# Patient Record
Sex: FEMALE
[De-identification: names, ages, dates, MRNs, and addresses within clinical notes are randomized per-mention and may not be internally consistent; named-entity substitution may affect disease eponyms.]

---

## 2021-09-04 ENCOUNTER — NURSE TRIAGE (OUTPATIENT)
Dept: OTHER | Facility: CLINIC | Age: 76
End: 2021-09-04

## 2021-09-04 NOTE — TELEPHONE ENCOUNTER
PREGNANCY: \"Is there any chance you are pregnant? \" \"When was your last menstrual period? \"       No hysterectomy.     Protocols used: [facility]

## 2023-10-03 ENCOUNTER — TELEPHONE (OUTPATIENT)
Dept: PAIN MEDICINE | Facility: CLINIC | Age: 78
End: 2023-10-03
Payer: MEDICARE

## 2023-10-10 ENCOUNTER — TELEPHONE (OUTPATIENT)
Dept: PAIN MEDICINE | Facility: CLINIC | Age: 78
End: 2023-10-10
Payer: MEDICARE

## 2023-10-13 DIAGNOSIS — M54.16 LUMBAR NEURITIS: Primary | ICD-10-CM

## 2023-11-30 ENCOUNTER — APPOINTMENT (OUTPATIENT)
Dept: PAIN MEDICINE | Facility: CLINIC | Age: 78
End: 2023-11-30
Payer: MEDICARE

## 2023-11-30 ENCOUNTER — ANCILLARY PROCEDURE (OUTPATIENT)
Dept: RADIOLOGY | Facility: CLINIC | Age: 78
End: 2023-11-30
Payer: MEDICARE

## 2023-11-30 ENCOUNTER — HOSPITAL ENCOUNTER (OUTPATIENT)
Dept: PAIN MEDICINE | Facility: CLINIC | Age: 78
Discharge: HOME | End: 2023-11-30
Payer: MEDICARE

## 2023-11-30 VITALS
TEMPERATURE: 97.7 F | DIASTOLIC BLOOD PRESSURE: 59 MMHG | WEIGHT: 146 LBS | RESPIRATION RATE: 15 BRPM | HEIGHT: 60 IN | SYSTOLIC BLOOD PRESSURE: 116 MMHG | OXYGEN SATURATION: 95 % | BODY MASS INDEX: 28.66 KG/M2 | HEART RATE: 68 BPM

## 2023-11-30 DIAGNOSIS — M54.16 LUMBAR NEURITIS: ICD-10-CM

## 2023-11-30 PROCEDURE — 7100000009 HC PHASE TWO TIME - INITIAL BASE CHARGE

## 2023-11-30 PROCEDURE — A4216 STERILE WATER/SALINE, 10 ML: HCPCS

## 2023-11-30 PROCEDURE — 2500000005 HC RX 250 GENERAL PHARMACY W/O HCPCS

## 2023-11-30 PROCEDURE — 77003 FLUOROGUIDE FOR SPINE INJECT: CPT

## 2023-11-30 PROCEDURE — 2500000004 HC RX 250 GENERAL PHARMACY W/ HCPCS (ALT 636 FOR OP/ED)

## 2023-11-30 PROCEDURE — 7100000010 HC PHASE TWO TIME - EACH INCREMENTAL 1 MINUTE

## 2023-11-30 PROCEDURE — 62323 NJX INTERLAMINAR LMBR/SAC: CPT | Performed by: ANESTHESIOLOGY

## 2023-11-30 RX ORDER — TRIAMCINOLONE ACETONIDE 40 MG/ML
INJECTION, SUSPENSION INTRA-ARTICULAR; INTRAMUSCULAR
Status: COMPLETED
Start: 2023-11-30 | End: 2023-11-30

## 2023-11-30 RX ORDER — LEVOTHYROXINE SODIUM 75 UG/1
TABLET ORAL
COMMUNITY
Start: 2013-03-20

## 2023-11-30 RX ORDER — LOSARTAN POTASSIUM 25 MG/1
TABLET ORAL
COMMUNITY
Start: 2022-04-08

## 2023-11-30 RX ORDER — ALLOPURINOL 100 MG/1
TABLET ORAL
COMMUNITY
Start: 2020-09-23

## 2023-11-30 RX ORDER — SODIUM CHLORIDE 9 MG/ML
INJECTION, SOLUTION INTRAMUSCULAR; INTRAVENOUS; SUBCUTANEOUS
Status: COMPLETED
Start: 2023-11-30 | End: 2023-11-30

## 2023-11-30 RX ORDER — SERTRALINE HYDROCHLORIDE 100 MG/1
TABLET, FILM COATED ORAL
COMMUNITY
Start: 2018-01-08

## 2023-11-30 RX ORDER — METFORMIN HYDROCHLORIDE 500 MG/1
500 TABLET, EXTENDED RELEASE ORAL
COMMUNITY

## 2023-11-30 RX ORDER — ROSUVASTATIN CALCIUM 40 MG/1
TABLET, COATED ORAL
COMMUNITY
Start: 2012-11-26

## 2023-11-30 RX ORDER — LIDOCAINE HYDROCHLORIDE 5 MG/ML
INJECTION, SOLUTION INFILTRATION; INTRAVENOUS
Status: COMPLETED
Start: 2023-11-30 | End: 2023-11-30

## 2023-11-30 RX ORDER — OMEPRAZOLE 40 MG/1
CAPSULE, DELAYED RELEASE ORAL
COMMUNITY
Start: 2021-04-27

## 2023-11-30 RX ADMIN — LIDOCAINE HYDROCHLORIDE 250 MG: 5 INJECTION, SOLUTION INFILTRATION at 13:09

## 2023-11-30 RX ADMIN — TRIAMCINOLONE ACETONIDE 40 MG: 40 INJECTION, SUSPENSION INTRA-ARTICULAR; INTRAMUSCULAR at 13:10

## 2023-11-30 RX ADMIN — SODIUM CHLORIDE 10 ML: 9 INJECTION, SOLUTION INTRAMUSCULAR; INTRAVENOUS; SUBCUTANEOUS at 13:10

## 2023-11-30 ASSESSMENT — ENCOUNTER SYMPTOMS
DIAPHORESIS: 0
CONSTIPATION: 0
SPEECH DIFFICULTY: 0
CHEST TIGHTNESS: 0
SHORTNESS OF BREATH: 0
COUGH: 0
EYE DISCHARGE: 0
DIZZINESS: 0
FEVER: 0
CHILLS: 0
BACK PAIN: 1
SEIZURES: 0
DIFFICULTY URINATING: 0
NECK STIFFNESS: 0
BLOOD IN STOOL: 0
WHEEZING: 0
ARTHRALGIAS: 1
WEAKNESS: 0
NUMBNESS: 1
DIARRHEA: 0
NAUSEA: 0
VOMITING: 0
NECK PAIN: 0

## 2023-11-30 ASSESSMENT — COLUMBIA-SUICIDE SEVERITY RATING SCALE - C-SSRS
1. IN THE PAST MONTH, HAVE YOU WISHED YOU WERE DEAD OR WISHED YOU COULD GO TO SLEEP AND NOT WAKE UP?: NO
6. HAVE YOU EVER DONE ANYTHING, STARTED TO DO ANYTHING, OR PREPARED TO DO ANYTHING TO END YOUR LIFE?: NO
2. HAVE YOU ACTUALLY HAD ANY THOUGHTS OF KILLING YOURSELF?: NO

## 2023-11-30 ASSESSMENT — PAIN SCALES - GENERAL
PAINLEVEL_OUTOF10: 7
PAINLEVEL_OUTOF10: 0 - NO PAIN

## 2023-11-30 ASSESSMENT — PAIN DESCRIPTION - DESCRIPTORS: DESCRIPTORS: ACHING

## 2023-11-30 ASSESSMENT — PAIN - FUNCTIONAL ASSESSMENT
PAIN_FUNCTIONAL_ASSESSMENT: 0-10
PAIN_FUNCTIONAL_ASSESSMENT: 0-10

## 2023-11-30 NOTE — OP NOTE
11/30/2023    Pre procedure Diagnosis: Lumbar neuritis  Post procedure Diagnosis: Lumbar neuritis    Procedure:     1. L5/S1 interlaminar epidural steroid injection   2. Fluoroscopic guidance     Complications: None    Assistants: None     EBL: None    PROCEDURE: The patient was identified in the preoperative area. After risks and benefits were explained, informed consent was obtained. The patient was brought back to the operating room and placed in the prone position on the operating table. Standard ASA monitors were applied and monitored throughout the procedure. Their vital signs remained stable throughout the procedure. The patient's lumbosacral spine was prepped and draped in usual sterile fashion. Using fluoroscopic guidance the skin overlying the trajectory to the L5/S1 space was anesthetized with a total of 5 ml of 0.5% Lidocaine. Thereafter, a 3.5 in long 18G Touhy needle was advanced through the anesthitized skin. Then, the needle was advanced into the L5/S1 ligamentum flavum under multiplanar fluoroscopic guidance.  Then using a loss of resistance syringe and technique the epidural space was accessed.  After negative aspiration for heme, or CSF a total of 4mL of Preservative Free Normal Saline and 40 mg of KENALOG was injected. The needle was removed and a sterile dressing was applied. The patient tolerated the procedure well and was transported to PACU in stable condition.    PLAN: The pt will follow up in the office in one to two months to report their results with the procedure. Discharge instructions were reviewed in recovery and provided to the patient in writing. They were advised to call should they have any questions or concerns.

## 2023-11-30 NOTE — H&P
History Of Present Illness  Tea Wood is a 78 y.o. female presenting with lumbar neuritis.     Past Medical History  Past Medical History:   Diagnosis Date    Anesthesia of skin     Numbness    Dizziness and giddiness 01/05/2016    Dizziness    Dysphagia, unspecified     Difficulty swallowing    Other specified cough     Productive cough    Other specified soft tissue disorders     Leg swelling    Personal history of other diseases of the circulatory system     History of hypertension    Personal history of other diseases of the musculoskeletal system and connective tissue 01/06/2016    History of rheumatoid arthritis    Personal history of other diseases of the nervous system and sense organs     History of tinnitus    Personal history of other diseases of the nervous system and sense organs     History of hearing loss    Personal history of other diseases of the respiratory system     Personal history of asthma    Personal history of other endocrine, nutritional and metabolic disease     History of diabetes mellitus    Personal history of other specified conditions     History of shortness of breath    Snoring     Snoring    Unspecified epiphora, bilateral     Watery eyes       Surgical History  Past Surgical History:   Procedure Laterality Date    HYSTERECTOMY  01/05/2016    Hysterectomy    KNEE ARTHROSCOPY W/ DEBRIDEMENT  01/05/2016    Arthroscopy Knee    OTHER SURGICAL HISTORY  01/15/2014    Surgery    ROTATOR CUFF REPAIR  01/05/2016    Rotator Cuff Repair    SHOULDER SURGERY  01/05/2016    Shoulder Surgery    TOTAL KNEE ARTHROPLASTY  01/05/2016    Knee Replacement        Social History  She reports that she has never smoked. She has never used smokeless tobacco. She reports that she does not drink alcohol and does not use drugs.    Family History  No family history on file.     Allergies  Codeine and Sulfa (sulfonamide antibiotics)    Review of Systems   Constitutional:  Negative for chills, diaphoresis  and fever.   HENT:  Negative for ear discharge and tinnitus.    Eyes:  Negative for discharge.   Respiratory:  Negative for cough, chest tightness, shortness of breath and wheezing.    Cardiovascular:  Negative for chest pain.   Gastrointestinal:  Negative for blood in stool, constipation, diarrhea, nausea and vomiting.   Endocrine: Negative for polyuria.   Genitourinary:  Negative for difficulty urinating.   Musculoskeletal:  Positive for arthralgias, back pain and gait problem. Negative for neck pain and neck stiffness.   Skin:  Negative for rash.   Neurological:  Positive for numbness. Negative for dizziness, seizures, speech difficulty and weakness.        Physical Exam  Constitutional:       Appearance: Normal appearance.   HENT:      Head: Normocephalic.      Nose: Nose normal.      Mouth/Throat:      Mouth: Mucous membranes are moist.      Pharynx: Oropharynx is clear.   Eyes:      Extraocular Movements: Extraocular movements intact.      Conjunctiva/sclera: Conjunctivae normal.      Pupils: Pupils are equal, round, and reactive to light.   Cardiovascular:      Rate and Rhythm: Normal rate.   Pulmonary:      Effort: Pulmonary effort is normal. No respiratory distress.      Breath sounds: No wheezing.   Chest:      Chest wall: No tenderness.   Abdominal:      Palpations: Abdomen is soft.   Musculoskeletal:      Cervical back: No rigidity.   Skin:     General: Skin is warm and dry.      Findings: No rash.   Neurological:      Mental Status: She is alert and oriented to person, place, and time.      Sensory: Sensory deficit present.      Motor: Weakness present.      Gait: Gait abnormal.   Psychiatric:         Mood and Affect: Mood normal.         Behavior: Behavior normal.          Last Recorded Vitals  Blood pressure 116/59, pulse 68, temperature 36.5 °C (97.7 °F), resp. rate 15, height 1.524 m (5'), weight 66.2 kg (146 lb), SpO2 95 %.       Assessment/Plan   1. Lumbar neuritis  Epidural Steroid Injection     Epidural Steroid Injection         Lumbar epidural injection.     Gino Davidson MD

## 2023-12-27 PROBLEM — L23.7 ALLERGIC CONTACT DERMATITIS DUE TO PLANTS, EXCEPT FOOD: Status: ACTIVE | Noted: 2020-07-12

## 2023-12-27 PROBLEM — M15.4 EROSIVE OSTEOARTHRITIS: Status: ACTIVE | Noted: 2023-12-27

## 2023-12-27 PROBLEM — Z86.79 H/O: HYPERTENSION: Status: ACTIVE | Noted: 2023-12-27

## 2023-12-27 PROBLEM — Z86.69 HISTORY OF HEARING PROBLEM: Status: ACTIVE | Noted: 2023-12-27

## 2023-12-27 PROBLEM — Z86.39 H/O THYROID NODULE: Status: ACTIVE | Noted: 2023-12-27

## 2023-12-27 PROBLEM — H93.13 SUBJECTIVE TINNITUS OF BOTH EARS: Status: ACTIVE | Noted: 2023-12-27

## 2023-12-27 PROBLEM — E79.0 HYPERURICEMIA WITHOUT SIGNS OF INFLAMMATORY ARTHRITIS AND TOPHACEOUS DISEASE: Status: ACTIVE | Noted: 2022-06-03

## 2023-12-27 PROBLEM — T14.8XXA HEMATOMA: Status: ACTIVE | Noted: 2023-12-27

## 2023-12-27 PROBLEM — R06.83 SNORING: Status: ACTIVE | Noted: 2023-12-27
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
## 2023-12-27 PROBLEM — H04.209 EPIPHORA: Status: ACTIVE | Noted: 2023-12-27

## 2023-12-27 PROBLEM — M79.89 SWELLING OF LOWER LIMB: Status: ACTIVE | Noted: 2023-12-27

## 2023-12-27 PROBLEM — R07.9 CHEST PAIN: Status: ACTIVE | Noted: 2017-12-12

## 2023-12-27 PROBLEM — R20.0 NUMBNESS: Status: ACTIVE | Noted: 2023-12-27

## 2023-12-27 PROBLEM — E66.3 OVERWEIGHT WITH BODY MASS INDEX (BMI) 25.0-29.9: Status: ACTIVE | Noted: 2020-07-12

## 2023-12-27 PROBLEM — K63.5 COLON POLYPS: Status: ACTIVE | Noted: 2023-12-27

## 2023-12-27 PROBLEM — R09.89 CAROTID BRUIT: Status: ACTIVE | Noted: 2023-12-27

## 2023-12-27 PROBLEM — I35.8 AORTIC HEART MURMUR: Status: ACTIVE | Noted: 2023-12-27

## 2023-12-27 PROBLEM — R41.3 MEMORY LOSS: Status: ACTIVE | Noted: 2023-12-27

## 2023-12-27 PROBLEM — M12.9 ARTHROPATHY: Status: ACTIVE | Noted: 2023-12-27

## 2023-12-27 PROBLEM — N18.30 STAGE 3 CHRONIC KIDNEY DISEASE (MULTI): Status: ACTIVE | Noted: 2023-12-27

## 2023-12-27 PROBLEM — R76.8 ANA POSITIVE: Status: ACTIVE | Noted: 2023-12-27

## 2023-12-27 PROBLEM — D75.A DEFICIENCY OF GLUCOSE-6-PHOSPHATE DEHYDROGENASE: Status: ACTIVE | Noted: 2023-12-27

## 2023-12-27 PROBLEM — K64.9 HEMORRHOIDS: Status: ACTIVE | Noted: 2023-12-27

## 2023-12-27 PROBLEM — H04.203 BILATERAL EPIPHORA: Status: ACTIVE | Noted: 2023-12-27

## 2023-12-27 PROBLEM — H93.19 SUBJECTIVE TINNITUS: Status: ACTIVE | Noted: 2023-11-21

## 2023-12-27 PROBLEM — R42 DIZZINESS: Status: ACTIVE | Noted: 2023-12-27

## 2023-12-27 PROBLEM — M54.9 BACK PAIN, CHRONIC: Status: ACTIVE | Noted: 2023-12-27

## 2023-12-27 PROBLEM — H61.23 IMPACTED CERUMEN OF BOTH EARS: Status: ACTIVE | Noted: 2023-12-27

## 2023-12-27 PROBLEM — S09.90XA HEAD TRAUMA: Status: ACTIVE | Noted: 2023-12-27

## 2023-12-27 PROBLEM — H90.3 SENSORINEURAL HEARING LOSS OF BOTH EARS: Status: ACTIVE | Noted: 2023-12-27

## 2023-12-27 PROBLEM — E66.9 OBESITY, CLASS I, BMI 30-34.9: Status: ACTIVE | Noted: 2021-02-09

## 2023-12-27 PROBLEM — E11.649: Status: ACTIVE | Noted: 2023-12-27

## 2023-12-27 PROBLEM — R79.0 ABNORMAL LEVEL OF BLOOD MINERAL: Status: ACTIVE | Noted: 2021-09-29

## 2023-12-27 PROBLEM — E66.811 OBESITY, CLASS I, BMI 30-34.9: Status: ACTIVE | Noted: 2021-02-09

## 2023-12-27 PROBLEM — F41.9 ANXIETY DISORDER: Status: ACTIVE | Noted: 2017-12-12

## 2023-12-27 PROBLEM — H61.20 IMPACTED CERUMEN: Status: ACTIVE | Noted: 2023-12-27

## 2023-12-27 PROBLEM — G89.29 BACK PAIN, CHRONIC: Status: ACTIVE | Noted: 2023-12-27

## 2023-12-27 PROBLEM — Z86.69 HISTORY OF HEARING LOSS: Status: ACTIVE | Noted: 2023-12-27

## 2023-12-27 PROBLEM — R15.9 FECAL INCONTINENCE: Status: ACTIVE | Noted: 2023-12-27

## 2023-12-27 PROBLEM — S20.219A RIB CONTUSION: Status: ACTIVE | Noted: 2023-12-27

## 2023-12-27 PROBLEM — M45.9 ANKYLOSING POLYARTHRITIS (MULTI): Status: ACTIVE | Noted: 2023-12-27

## 2023-12-27 PROBLEM — F32.A DEPRESSION: Status: ACTIVE | Noted: 2017-12-12

## 2023-12-27 PROBLEM — M10.9 GOUT: Status: ACTIVE | Noted: 2021-09-15

## 2023-12-27 PROBLEM — J02.0 STREP THROAT: Status: ACTIVE | Noted: 2023-12-27

## 2023-12-27 PROBLEM — R05.8 PRODUCTIVE COUGH: Status: ACTIVE | Noted: 2023-12-27

## 2023-12-27 PROBLEM — E11.40 NEUROPATHY, DIABETIC (MULTI): Status: ACTIVE | Noted: 2023-12-27

## 2023-12-27 PROBLEM — E83.42 HYPOMAGNESEMIA: Status: ACTIVE | Noted: 2021-09-29

## 2023-12-27 PROBLEM — R10.11 RIGHT UPPER QUADRANT ABDOMINAL PAIN: Status: ACTIVE | Noted: 2021-02-09

## 2023-12-27 RX ORDER — METFORMIN HYDROCHLORIDE 500 MG/1
500 TABLET, EXTENDED RELEASE ORAL
COMMUNITY
Start: 2023-05-23

## 2023-12-27 RX ORDER — ESOMEPRAZOLE MAGNESIUM 40 MG/1
40 CAPSULE, DELAYED RELEASE ORAL
COMMUNITY
Start: 2021-02-09 | End: 2024-02-07 | Stop reason: ALTCHOICE

## 2023-12-27 RX ORDER — ERGOCALCIFEROL 1.25 MG/1
CAPSULE ORAL
COMMUNITY

## 2023-12-27 RX ORDER — LANOLIN ALCOHOL/MO/W.PET/CERES
1000 CREAM (GRAM) TOPICAL
COMMUNITY
Start: 2021-02-09

## 2023-12-27 RX ORDER — COLCHICINE 0.6 MG/1
CAPSULE ORAL
COMMUNITY
Start: 2021-07-27

## 2023-12-27 RX ORDER — TRAVOPROST OPHTHALMIC SOLUTION 0.04 MG/ML
SOLUTION OPHTHALMIC
COMMUNITY
Start: 2020-12-07 | End: 2024-02-07 | Stop reason: ALTCHOICE

## 2023-12-27 RX ORDER — ROSUVASTATIN CALCIUM 20 MG/1
TABLET, COATED ORAL
COMMUNITY
Start: 2014-08-11 | End: 2024-02-07 | Stop reason: ALTCHOICE

## 2023-12-27 RX ORDER — MOXIFLOXACIN 5 MG/ML
SOLUTION/ DROPS OPHTHALMIC
COMMUNITY
Start: 2023-09-18 | End: 2024-02-07 | Stop reason: ALTCHOICE

## 2023-12-27 RX ORDER — PEN NEEDLE, DIABETIC 31 GX5/16"
NEEDLE, DISPOSABLE MISCELLANEOUS
COMMUNITY
Start: 2018-10-02

## 2023-12-27 RX ORDER — ASPIRIN 81 MG/1
81 TABLET ORAL
COMMUNITY

## 2023-12-27 RX ORDER — PREDNISOLONE ACETATE 10 MG/ML
SUSPENSION/ DROPS OPHTHALMIC
COMMUNITY
Start: 2023-09-18 | End: 2024-02-07 | Stop reason: ALTCHOICE

## 2023-12-27 RX ORDER — ACETAMINOPHEN 500 MG
1 TABLET ORAL DAILY
COMMUNITY
Start: 2016-01-11

## 2023-12-27 RX ORDER — LATANOPROST 50 UG/ML
SOLUTION/ DROPS OPHTHALMIC
COMMUNITY
End: 2024-02-07 | Stop reason: ALTCHOICE

## 2023-12-27 RX ORDER — SERTRALINE HYDROCHLORIDE 25 MG/1
25 TABLET, FILM COATED ORAL
COMMUNITY
Start: 2021-02-09

## 2023-12-27 RX ORDER — GABAPENTIN 100 MG/1
100 CAPSULE ORAL DAILY
COMMUNITY

## 2023-12-27 RX ORDER — OLIVE OIL
OIL (ML) MISCELLANEOUS
COMMUNITY
Start: 2023-08-11

## 2023-12-27 RX ORDER — CHOLECALCIFEROL (VITAMIN D3) 1250 MCG
TABLET ORAL
COMMUNITY

## 2023-12-27 RX ORDER — BLOOD SUGAR DIAGNOSTIC
STRIP MISCELLANEOUS
COMMUNITY
Start: 2013-02-25

## 2023-12-27 RX ORDER — BIMATOPROST 0.1 MG/ML
SOLUTION/ DROPS OPHTHALMIC
COMMUNITY
Start: 2021-04-22 | End: 2024-02-07 | Stop reason: ALTCHOICE

## 2023-12-27 RX ORDER — ALUMINUM ZIRCONIUM OCTACHLOROHYDREX GLY 16 G/100G
4 GEL TOPICAL
COMMUNITY
Start: 2023-07-05 | End: 2024-02-07 | Stop reason: ALTCHOICE

## 2023-12-27 RX ORDER — METHYLPREDNISOLONE 4 MG/1
TABLET ORAL
COMMUNITY
Start: 2023-06-28 | End: 2024-02-07 | Stop reason: ALTCHOICE

## 2023-12-27 RX ORDER — MECLIZINE HYDROCHLORIDE 25 MG/1
25 TABLET ORAL
COMMUNITY
Start: 2022-03-31 | End: 2024-02-07 | Stop reason: ALTCHOICE

## 2023-12-27 RX ORDER — INDOMETHACIN 50 MG/1
CAPSULE ORAL
COMMUNITY
Start: 2023-01-11 | End: 2024-02-07 | Stop reason: ALTCHOICE

## 2023-12-27 RX ORDER — SENNOSIDES 8.6 MG/1
17.2 TABLET ORAL
COMMUNITY
Start: 2023-05-30

## 2024-01-09 ENCOUNTER — APPOINTMENT (OUTPATIENT)
Dept: PAIN MEDICINE | Facility: CLINIC | Age: 79
End: 2024-01-09
Payer: MEDICARE

## 2024-02-07 ENCOUNTER — OFFICE VISIT (OUTPATIENT)
Dept: PAIN MEDICINE | Facility: CLINIC | Age: 79
End: 2024-02-07
Payer: MEDICARE

## 2024-02-07 VITALS
HEART RATE: 63 BPM | DIASTOLIC BLOOD PRESSURE: 62 MMHG | TEMPERATURE: 95.5 F | SYSTOLIC BLOOD PRESSURE: 140 MMHG | BODY MASS INDEX: 28.51 KG/M2 | WEIGHT: 146 LBS | RESPIRATION RATE: 16 BRPM

## 2024-02-07 DIAGNOSIS — M51.26 LUMBAR DISC HERNIATION: Primary | ICD-10-CM

## 2024-02-07 DIAGNOSIS — G89.29 CHRONIC RIGHT-SIDED LOW BACK PAIN WITH RIGHT-SIDED SCIATICA: ICD-10-CM

## 2024-02-07 DIAGNOSIS — M54.41 CHRONIC RIGHT-SIDED LOW BACK PAIN WITH RIGHT-SIDED SCIATICA: ICD-10-CM

## 2024-02-07 DIAGNOSIS — M54.16 LUMBAR NEURITIS: ICD-10-CM

## 2024-02-07 DIAGNOSIS — M47.816 LUMBAR SPONDYLOSIS: ICD-10-CM

## 2024-02-07 PROCEDURE — 99214 OFFICE O/P EST MOD 30 MIN: CPT | Performed by: ANESTHESIOLOGY

## 2024-02-07 ASSESSMENT — ENCOUNTER SYMPTOMS
OCCASIONAL FEELINGS OF UNSTEADINESS: 1
LOSS OF SENSATION IN FEET: 0

## 2024-02-07 ASSESSMENT — PAIN SCALES - GENERAL: PAINLEVEL: 6

## 2024-02-07 ASSESSMENT — LIFESTYLE VARIABLES
AUDIT-C TOTAL SCORE: 0
HOW OFTEN DO YOU HAVE A DRINK CONTAINING ALCOHOL: NEVER
HOW OFTEN DO YOU HAVE SIX OR MORE DRINKS ON ONE OCCASION: NEVER
SKIP TO QUESTIONS 9-10: 1
HOW MANY STANDARD DRINKS CONTAINING ALCOHOL DO YOU HAVE ON A TYPICAL DAY: PATIENT DOES NOT DRINK

## 2024-02-07 ASSESSMENT — COLUMBIA-SUICIDE SEVERITY RATING SCALE - C-SSRS
1. IN THE PAST MONTH, HAVE YOU WISHED YOU WERE DEAD OR WISHED YOU COULD GO TO SLEEP AND NOT WAKE UP?: NO
2. HAVE YOU ACTUALLY HAD ANY THOUGHTS OF KILLING YOURSELF?: NO
6. HAVE YOU EVER DONE ANYTHING, STARTED TO DO ANYTHING, OR PREPARED TO DO ANYTHING TO END YOUR LIFE?: NO

## 2024-02-07 ASSESSMENT — PATIENT HEALTH QUESTIONNAIRE - PHQ9
1. LITTLE INTEREST OR PLEASURE IN DOING THINGS: NOT AT ALL
SUM OF ALL RESPONSES TO PHQ9 QUESTIONS 1 AND 2: 0
2. FEELING DOWN, DEPRESSED OR HOPELESS: NOT AT ALL

## 2024-02-07 NOTE — PROGRESS NOTES
History Of Present Illness  Tea Wood is a 78 y.o. female presenting with   Chief Complaint   Patient presents with    Follow-up       Patient follows up with her daughter regarding improved chronic low back pain to the right hip area and down her RLE. The pain was constant, worse with prolonged walking / sitting and better with wearing a corset rest/sitting/reclining. The pain is sharp, stabbing and shooting to the R LE. Denies LE paresthesias, weakness, saddle anesthesia, bowel or bladder incontinence. To manage this pain the patient has attempted Tylenol with some relief of her pain. The patients chronic NIDDM, hypothyroidism are stable.     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        PCP: Dr. Cartagena        Past Medical History  She has a past medical history of Anesthesia of skin, Dizziness and giddiness (01/05/2016), Dysphagia, unspecified, Other specified cough, Other specified soft tissue disorders, Personal history of other diseases of the circulatory system, Personal history of other diseases of the musculoskeletal system and connective tissue (01/06/2016), Personal history of other diseases of the nervous system and sense organs, Personal history of other diseases of the nervous system and sense organs, Personal history of other diseases of the respiratory system, Personal history of other endocrine, nutritional and metabolic disease, Personal history of other specified conditions, Snoring, and Unspecified epiphora, bilateral.    Surgical History  She has a past surgical history that includes Other surgical history (01/15/2014); Shoulder surgery (01/05/2016); Rotator cuff repair (01/05/2016); Knee arthroscopy w/ debridement (01/05/2016); Hysterectomy (01/05/2016); and Total knee arthroplasty (01/05/2016).     Social History  She reports that she has never smoked. She has never used smokeless  tobacco. She reports that she does not drink alcohol and does not use drugs.    Family History  No family history on file.     Allergies  Codeine, Ragweed, and Sulfa (sulfonamide antibiotics)    Review of Systems    All other systems reviewed and negative for any deficits. Pertinent positives and negatives were considered in the medical decision making process.        Physical Exam  /62   Pulse 63   Temp 35.3 °C (95.5 °F)   Resp 16   Wt 66.2 kg (146 lb)     General: Pt appears stated age     Eyes: Conjunctiva non-icteric and lids without obvious rash or drooping. Pupils are symmetric     ENT: External ears are without deformity or rash. Hearing is grossly intact     Neck: No JVD noted, tracheal position midline.      Respiratory: No gasping or shortness of breath noted, no use of accessory muscles noted     Cardiovascular: Extremities show no edema or varicosities    Skin: No rashes or open lesions/ulcers identified on skin. No induration/tightening noted with palpation of skin     Musculoskeletal: Gait is grossly normal     Digits/nails show no clubbing or cyanosis     Exam of muscles/joints/bones shows no gross atrophy and no abnormal/involuntary movements in the head/neckNo asymmetry or masses noted in the head/neck     Stability: no subluxation noted on movement of bilateral upper extremities or head/neck     Strength: 4/5 in RLE and 5/5 in LLE      Range of Motion: WNL      Neurologic: Reflexes: 1+ in RLE      Sensation: dec to sharp touch in LLE      Cranial nerves 2-12 are grossly intact     Psychiatric: Pt is alert and oriented to time, place and person.         Assessment/Plan   1. Lumbar disc herniation        2. Lumbar neuritis        3. Lumbar spondylosis        4. Chronic right-sided low back pain with right-sided sciatica             Diagnoses/Problems     · Lumbar disc herniation (722.10) (M51.26)   · Lumbar neuritis (724.4) (M54.16)   · Lumbar stenosis (724.02) (M48.061)   · Chronic low  back pain (724.2,338.29) (M54.50,G89.29)     Provider Impressions     1. I have provided the patient with a list of physical therapy exercises to learn and perform to strengthen core, maintain stabilization, and reduce pain. We reviewed the exercises in detail and I encouraged them to perform them on a regular basis.     2. I would recommend the pt start on Gabapentin to help with nerve related pain. We discussed the risks, benefits, and side effects to this medication including the mechanism of action and the pt understands and agrees.     3. I extensively reviewed the patients MRI findings ([facility] 2023) in detail, including review of the actual images and provided a detailed explanation of the findings using a spine model.      There are disc herniations at the L3/4, L4/5, and L5/S1 level with L4/5 being the largest of the disc herniations. There is noted moderate to severe canal stenosis at L5/S1      4. The patient is a candidate for an LESI at L5/S1 to treat back and radicular pain. I spent time with the patient discussing all of the risks, benefits, and alternatives to this measure. Including but not limited to spinal infection, epidural hematoma/abscess, paralysis, nerve injury, steroid effects, and spinal headache. The patient understands and agrees to proceed.    Her last injection was 11- and she obtained 100% relief of her pain that lasted for 3 months and is ongoing in her right lateral thigh. Her pain in her low back pain is slowly returning.     I spent time with the patient reviewing their imaging and discussing the risks benefits and alternatives to the above plan. A total of 30 minutes was spent reviewing the data and greater than 50% of that time was with the patient during the face to face encounter discussing treatment options both surgical, non-surgical, and minimally invasive techniques.       Gino Davidson MD

## 2024-03-01 ENCOUNTER — TELEPHONE (OUTPATIENT)
Dept: PAIN MEDICINE | Facility: CLINIC | Age: 79
End: 2024-03-01
Payer: MEDICARE

## 2024-03-01 DIAGNOSIS — M54.16 LUMBAR NEURITIS: Primary | ICD-10-CM

## 2024-03-01 NOTE — TELEPHONE ENCOUNTER
Patients Daughter Julieta called and stated that her Mom, Tea is the patient.  She stated her having a very hard time walking and is wanting to see if she can be scheduled for a hip injection.  She took her Mom to Dr Cartagena and she told them to call to see request an injection.  Daughter stated there is xray done at [facility] done at May 2023.  She was in for an office visit with you on February 7.    Please advise if patient can be scheduled.   Thank you

## 2024-03-06 DIAGNOSIS — M25.552 BILATERAL HIP PAIN: Primary | ICD-10-CM

## 2024-03-06 DIAGNOSIS — M25.551 BILATERAL HIP PAIN: Primary | ICD-10-CM

## 2024-04-04 ENCOUNTER — HOSPITAL ENCOUNTER (OUTPATIENT)
Dept: RADIOLOGY | Facility: CLINIC | Age: 79
Discharge: HOME | End: 2024-04-04
Payer: MEDICARE

## 2024-04-04 ENCOUNTER — HOSPITAL ENCOUNTER (OUTPATIENT)
Dept: PAIN MEDICINE | Facility: CLINIC | Age: 79
Discharge: HOME | End: 2024-04-04
Payer: MEDICARE

## 2024-04-04 VITALS
HEART RATE: 69 BPM | OXYGEN SATURATION: 96 % | DIASTOLIC BLOOD PRESSURE: 58 MMHG | SYSTOLIC BLOOD PRESSURE: 120 MMHG | TEMPERATURE: 98.6 F | RESPIRATION RATE: 15 BRPM | HEIGHT: 60 IN | WEIGHT: 146 LBS | BODY MASS INDEX: 28.66 KG/M2

## 2024-04-04 DIAGNOSIS — M25.552 BILATERAL HIP PAIN: ICD-10-CM

## 2024-04-04 DIAGNOSIS — M25.551 BILATERAL HIP PAIN: ICD-10-CM

## 2024-04-04 PROCEDURE — 20610 DRAIN/INJ JOINT/BURSA W/O US: CPT | Performed by: ANESTHESIOLOGY

## 2024-04-04 PROCEDURE — 2500000005 HC RX 250 GENERAL PHARMACY W/O HCPCS

## 2024-04-04 PROCEDURE — 2500000004 HC RX 250 GENERAL PHARMACY W/ HCPCS (ALT 636 FOR OP/ED)

## 2024-04-04 RX ORDER — TRIAMCINOLONE ACETONIDE 40 MG/ML
INJECTION, SUSPENSION INTRA-ARTICULAR; INTRAMUSCULAR
Status: COMPLETED
Start: 2024-04-04 | End: 2024-04-04

## 2024-04-04 RX ORDER — ROPIVACAINE HYDROCHLORIDE 5 MG/ML
INJECTION, SOLUTION EPIDURAL; INFILTRATION; PERINEURAL
Status: COMPLETED
Start: 2024-04-04 | End: 2024-04-04

## 2024-04-04 RX ORDER — LIDOCAINE HYDROCHLORIDE 5 MG/ML
INJECTION, SOLUTION INFILTRATION; INTRAVENOUS
Status: COMPLETED
Start: 2024-04-04 | End: 2024-04-04

## 2024-04-04 RX ORDER — SODIUM CHLORIDE 9 MG/ML
INJECTION, SOLUTION INTRAMUSCULAR; INTRAVENOUS; SUBCUTANEOUS
Status: DISCONTINUED
Start: 2024-04-04 | End: 2024-04-04 | Stop reason: WASHOUT

## 2024-04-04 RX ADMIN — LIDOCAINE HYDROCHLORIDE 250 MG: 5 INJECTION, SOLUTION INFILTRATION at 14:08

## 2024-04-04 RX ADMIN — TRIAMCINOLONE ACETONIDE 40 MG: 40 INJECTION, SUSPENSION INTRA-ARTICULAR; INTRAMUSCULAR at 14:10

## 2024-04-04 RX ADMIN — ROPIVACAINE HYDROCHLORIDE 100 MG: 5 INJECTION, SOLUTION EPIDURAL; INFILTRATION; PERINEURAL at 14:10

## 2024-04-04 ASSESSMENT — SOCIAL DETERMINANTS OF HEALTH (SDOH): IN THE PAST 12 MONTHS, HAS THE ELECTRIC, GAS, OIL, OR WATER COMPANY THREATENED TO SHUT OFF SERVICE IN YOUR HOME?: NO

## 2024-04-04 ASSESSMENT — ENCOUNTER SYMPTOMS
DEPRESSION: 1
OCCASIONAL FEELINGS OF UNSTEADINESS: 1
LOSS OF SENSATION IN FEET: 0

## 2024-04-04 ASSESSMENT — PAIN SCALES - GENERAL
PAINLEVEL_OUTOF10: 8
PAINLEVEL_OUTOF10: 1
PAINLEVEL_OUTOF10: 1

## 2024-04-04 ASSESSMENT — PAIN - FUNCTIONAL ASSESSMENT
PAIN_FUNCTIONAL_ASSESSMENT: 0-10

## 2024-04-04 NOTE — OP NOTE
PRE-PROCEDURE DIAGNOSIS: RIGHT hip osteoarthritis    POST-PROCEDURE DIAGNOSIS: Same as above    PROCEDURE:    1. RIGHT hip steroid injection    2. Fluoroscopic Guidance     ANESTHESIA: Local    COMPLICATIONS: None    ATTENDING: Gino Davidson M.D.    CLINICAL DATA: The patient is a 78 y.o. year old female presenting with a history of pain to the RIGHT hip joints. They present today for a hip joint injection.    PROCEDURE: The patient was identified in the preop area. After risks and benefits were discussed, informed consent was obtained. The patient was brought back to the operating room and placed in the lateral position with pressure points padded. Standard ASA monitors were applied and monitored throughout the procedure. The vitals signs were stable throughout. The patient's anterior hip area was prepped and draped in usual sterile fashion. Using fluoroscopic guidance the skin overlying the trajectory to the RIGHT hip joints was anesthetized with a total of 6.0 ml of 0.5% Lidocaine. Then a 3.5 inch long 22G Quincke needle was advanced under fluoroscopic guidance in the AP and Lateral view to its appropriate anatomical target. After negative aspiration for heme a total of 5 mL of 0.5% Ropivicaine and 40 mg of KENALOG was injected in equally divided doses. The needles were removed and a sterile dressings were applied. The patient tolerated the procedure well and was transported to PACU in good condition.

## 2024-05-21 ENCOUNTER — OFFICE VISIT (OUTPATIENT)
Dept: PAIN MEDICINE | Facility: CLINIC | Age: 79
End: 2024-05-21
Payer: MEDICARE

## 2024-05-21 VITALS
DIASTOLIC BLOOD PRESSURE: 64 MMHG | SYSTOLIC BLOOD PRESSURE: 138 MMHG | RESPIRATION RATE: 20 BRPM | HEART RATE: 74 BPM | BODY MASS INDEX: 27.88 KG/M2 | HEIGHT: 60 IN | OXYGEN SATURATION: 99 % | WEIGHT: 142 LBS | TEMPERATURE: 97.3 F

## 2024-05-21 DIAGNOSIS — M48.062 SPINAL STENOSIS OF LUMBAR REGION WITH NEUROGENIC CLAUDICATION: ICD-10-CM

## 2024-05-21 DIAGNOSIS — M51.26 LUMBAR DISC HERNIATION: Primary | ICD-10-CM

## 2024-05-21 DIAGNOSIS — M54.16 LUMBAR NEURITIS: ICD-10-CM

## 2024-05-21 PROCEDURE — 99214 OFFICE O/P EST MOD 30 MIN: CPT | Performed by: ANESTHESIOLOGY

## 2024-05-21 ASSESSMENT — PATIENT HEALTH QUESTIONNAIRE - PHQ9
2. FEELING DOWN, DEPRESSED OR HOPELESS: MORE THAN HALF THE DAYS
SUM OF ALL RESPONSES TO PHQ9 QUESTIONS 1 AND 2: 2
1. LITTLE INTEREST OR PLEASURE IN DOING THINGS: NOT AT ALL

## 2024-05-21 ASSESSMENT — ENCOUNTER SYMPTOMS
LOSS OF SENSATION IN FEET: 0
DEPRESSION: 0
OCCASIONAL FEELINGS OF UNSTEADINESS: 1

## 2024-05-21 ASSESSMENT — PAIN SCALES - GENERAL: PAINLEVEL: 9

## 2024-05-21 NOTE — H&P (VIEW-ONLY)
History Of Present Illness  Tea Wood is a 78 y.o. female presenting with   Chief Complaint   Patient presents with    Hip Pain       Patient follows up with her daughter regarding SEVERE FLARE UP OF HER  chronic low back pain NOW RADIATING INTO THE LEFT GROIN AREA. She was hospitalized at [facility] on 4- due to severity of her pain.      Her pain previously radiated to the right hip area and down her RLE that has completely resolved with her last injection.  The pain was constant, worse with prolonged walking / sitting and better with wearing a corset rest/sitting/reclining. The pain is sharp, stabbing and shooting to the R LE. Denies LE paresthesias, weakness, saddle anesthesia, bowel or bladder incontinence. To manage this pain the patient has attempted Tylenol with some relief of her pain. The patients chronic NIDDM, hypothyroidism are stable.     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        PCP: Dr. Cartagena        Past Medical History  She has a past medical history of Anesthesia of skin, Dizziness and giddiness (01/05/2016), Dysphagia, unspecified, Other specified cough, Other specified soft tissue disorders, Personal history of other diseases of the circulatory system, Personal history of other diseases of the musculoskeletal system and connective tissue (01/06/2016), Personal history of other diseases of the nervous system and sense organs, Personal history of other diseases of the nervous system and sense organs, Personal history of other diseases of the respiratory system, Personal history of other endocrine, nutritional and metabolic disease, Personal history of other specified conditions, Snoring, and Unspecified epiphora, bilateral.    Surgical History  She has a past surgical history that includes Other surgical history (01/15/2014); Shoulder surgery (01/05/2016); Rotator cuff repair  (01/05/2016); Knee arthroscopy w/ debridement (01/05/2016); Hysterectomy (01/05/2016); and Total knee arthroplasty (01/05/2016).     Social History  She reports that she has never smoked. She has never used smokeless tobacco. She reports that she does not drink alcohol and does not use drugs.    Family History  No family history on file.     Allergies  Codeine, Ragweed, and Sulfa (sulfonamide antibiotics)    Review of Systems    All other systems reviewed and negative for any deficits. Pertinent positives and negatives were considered in the medical decision making process.        Physical Exam  /64 (BP Location: Right arm)   Pulse 74   Temp 36.3 °C (97.3 °F)   Resp 20   Wt 64.4 kg (142 lb)   SpO2 99%     General: Pt appears stated age     Eyes: Conjunctiva non-icteric and lids without obvious rash or drooping. Pupils are symmetric     ENT: External ears are without deformity or rash. Hearing is grossly intact     Neck: No JVD noted, tracheal position midline.      Respiratory: No gasping or shortness of breath noted, no use of accessory muscles noted     Cardiovascular: Extremities show no edema or varicosities    Skin: No rashes or open lesions/ulcers identified on skin. No induration/tightening noted with palpation of skin     Musculoskeletal: Gait is grossly normal     Digits/nails show no clubbing or cyanosis     Exam of muscles/joints/bones shows no gross atrophy and no abnormal/involuntary movements in the head/neckNo asymmetry or masses noted in the head/neck     Stability: no subluxation noted on movement of bilateral upper extremities or head/neck     Strength: 4/5 in RLE and 5/5 in LLE      Range of Motion: WNL      Neurologic: Reflexes: 1+ in RLE      Sensation: dec to sharp touch in LLE      Cranial nerves 2-12 are grossly intact     Psychiatric: Pt is alert and oriented to time, place and person.         Assessment/Plan   1. Lumbar disc herniation        2. Lumbar neuritis        3. Spinal  stenosis of lumbar region with neurogenic claudication               Diagnoses/Problems   · Lumbar disc herniation (722.10) (M51.26)   · Lumbar neuritis (724.4) (M54.16)   · Lumbar stenosis (724.02) (M48.061)   · Chronic low back pain (724.2,338.29) (M54.50,G89.29)     Provider Impressions     1. I have provided the patient with a list of physical therapy exercises to learn and perform to strengthen core, maintain stabilization, and reduce pain. We reviewed the exercises in detail and I encouraged them to perform them on a regular basis.     2. I would recommend the pt start on Gabapentin to help with nerve related pain. We discussed the risks, benefits, and side effects to this medication including the mechanism of action and the pt understands and agrees.     3. I extensively reviewed the patients MRI findings ([facility] 2023) in detail, including review of the actual images and provided a detailed explanation of the findings using a spine model.      There are disc herniations at the L3/4, L4/5, and L5/S1 level with L4/5 being the largest of the disc herniations. There is noted moderate to severe canal stenosis at L5/S1     4. I extensively reviewed the patients NEW MRI AT [facility] (4-) findings in detail, including review of the actual images and provided a detailed explanation of the findings using a spine model.     It showed a sig worsening disc herniation at the L3 level      4. The patient is a candidate for an LEFT LTR L3 AND L4  vs LESI at L5/S1 to treat back and radicular pain. I spent time with the patient discussing all of the risks, benefits, and alternatives to this measure. Including but not limited to spinal infection, epidural hematoma/abscess, paralysis, nerve injury, steroid effects, and spinal headache. The patient understands and agrees to proceed.    Her last injection was 11- and she obtained 100% relief of her pain that lasted for 3 months and is ongoing in her right  lateral thigh. Her pain in her low back pain is slowly returning.     I spent time with the patient reviewing their imaging and discussing the risks benefits and alternatives to the above plan. A total of 30 minutes was spent reviewing the data and greater than 50% of that time was with the patient during the face to face encounter discussing treatment options both surgical, non-surgical, and minimally invasive techniques.       Gino Davidson MD

## 2024-05-21 NOTE — PROGRESS NOTES
History Of Present Illness  Tea Wood is a 78 y.o. female presenting with   Chief Complaint   Patient presents with    Hip Pain       Patient follows up with her daughter regarding SEVERE FLARE UP OF HER  chronic low back pain NOW RADIATING INTO THE LEFT GROIN AREA. She was hospitalized at [facility] on 4- due to severity of her pain.      Her pain previously radiated to the right hip area and down her RLE that has completely resolved with her last injection.  The pain was constant, worse with prolonged walking / sitting and better with wearing a corset rest/sitting/reclining. The pain is sharp, stabbing and shooting to the R LE. Denies LE paresthesias, weakness, saddle anesthesia, bowel or bladder incontinence. To manage this pain the patient has attempted Tylenol with some relief of her pain. The patients chronic NIDDM, hypothyroidism are stable.     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        PCP: Dr. Cartagena        Past Medical History  She has a past medical history of Anesthesia of skin, Dizziness and giddiness (01/05/2016), Dysphagia, unspecified, Other specified cough, Other specified soft tissue disorders, Personal history of other diseases of the circulatory system, Personal history of other diseases of the musculoskeletal system and connective tissue (01/06/2016), Personal history of other diseases of the nervous system and sense organs, Personal history of other diseases of the nervous system and sense organs, Personal history of other diseases of the respiratory system, Personal history of other endocrine, nutritional and metabolic disease, Personal history of other specified conditions, Snoring, and Unspecified epiphora, bilateral.    Surgical History  She has a past surgical history that includes Other surgical history (01/15/2014); Shoulder surgery (01/05/2016); Rotator cuff repair  (01/05/2016); Knee arthroscopy w/ debridement (01/05/2016); Hysterectomy (01/05/2016); and Total knee arthroplasty (01/05/2016).     Social History  She reports that she has never smoked. She has never used smokeless tobacco. She reports that she does not drink alcohol and does not use drugs.    Family History  No family history on file.     Allergies  Codeine, Ragweed, and Sulfa (sulfonamide antibiotics)    Review of Systems    All other systems reviewed and negative for any deficits. Pertinent positives and negatives were considered in the medical decision making process.        Physical Exam  /64 (BP Location: Right arm)   Pulse 74   Temp 36.3 °C (97.3 °F)   Resp 20   Wt 64.4 kg (142 lb)   SpO2 99%     General: Pt appears stated age     Eyes: Conjunctiva non-icteric and lids without obvious rash or drooping. Pupils are symmetric     ENT: External ears are without deformity or rash. Hearing is grossly intact     Neck: No JVD noted, tracheal position midline.      Respiratory: No gasping or shortness of breath noted, no use of accessory muscles noted     Cardiovascular: Extremities show no edema or varicosities    Skin: No rashes or open lesions/ulcers identified on skin. No induration/tightening noted with palpation of skin     Musculoskeletal: Gait is grossly normal     Digits/nails show no clubbing or cyanosis     Exam of muscles/joints/bones shows no gross atrophy and no abnormal/involuntary movements in the head/neckNo asymmetry or masses noted in the head/neck     Stability: no subluxation noted on movement of bilateral upper extremities or head/neck     Strength: 4/5 in RLE and 5/5 in LLE      Range of Motion: WNL      Neurologic: Reflexes: 1+ in RLE      Sensation: dec to sharp touch in LLE      Cranial nerves 2-12 are grossly intact     Psychiatric: Pt is alert and oriented to time, place and person.         Assessment/Plan   1. Lumbar disc herniation        2. Lumbar neuritis        3. Spinal  stenosis of lumbar region with neurogenic claudication               Diagnoses/Problems   · Lumbar disc herniation (722.10) (M51.26)   · Lumbar neuritis (724.4) (M54.16)   · Lumbar stenosis (724.02) (M48.061)   · Chronic low back pain (724.2,338.29) (M54.50,G89.29)     Provider Impressions     1. I have provided the patient with a list of physical therapy exercises to learn and perform to strengthen core, maintain stabilization, and reduce pain. We reviewed the exercises in detail and I encouraged them to perform them on a regular basis.     2. I would recommend the pt start on Gabapentin to help with nerve related pain. We discussed the risks, benefits, and side effects to this medication including the mechanism of action and the pt understands and agrees.     3. I extensively reviewed the patients MRI findings ([facility] 2023) in detail, including review of the actual images and provided a detailed explanation of the findings using a spine model.      There are disc herniations at the L3/4, L4/5, and L5/S1 level with L4/5 being the largest of the disc herniations. There is noted moderate to severe canal stenosis at L5/S1     4. I extensively reviewed the patients NEW MRI AT [facility] (4-) findings in detail, including review of the actual images and provided a detailed explanation of the findings using a spine model.     It showed a sig worsening disc herniation at the L3 level      4. The patient is a candidate for an LEFT LTR L3 AND L4  vs LESI at L5/S1 to treat back and radicular pain. I spent time with the patient discussing all of the risks, benefits, and alternatives to this measure. Including but not limited to spinal infection, epidural hematoma/abscess, paralysis, nerve injury, steroid effects, and spinal headache. The patient understands and agrees to proceed.    Her last injection was 11- and she obtained 100% relief of her pain that lasted for 3 months and is ongoing in her right  lateral thigh. Her pain in her low back pain is slowly returning.     I spent time with the patient reviewing their imaging and discussing the risks benefits and alternatives to the above plan. A total of 30 minutes was spent reviewing the data and greater than 50% of that time was with the patient during the face to face encounter discussing treatment options both surgical, non-surgical, and minimally invasive techniques.       Gino Davidson MD

## 2024-05-21 NOTE — PROGRESS NOTES
LEFT LOW BACK AND HIP PAIN 9-10   NEW MRI SI JOINT, LUMBAR LEFT LOWER EXTERMITY REVIEW'  PT AT HOME PHYSICAL THERAPY SINCE DISHCARGE FROM HOSPITAL   RIGHT HIP 0/10 - LAST RIGHT HIP INJECTION 4/5/24  LEFT HIP 9/10

## 2024-06-03 ENCOUNTER — APPOINTMENT (OUTPATIENT)
Dept: ALLERGY | Facility: CLINIC | Age: 79
End: 2024-06-03
Payer: MEDICARE

## 2024-06-06 ENCOUNTER — HOSPITAL ENCOUNTER (OUTPATIENT)
Dept: RADIOLOGY | Facility: CLINIC | Age: 79
Discharge: HOME | End: 2024-06-06
Payer: MEDICARE

## 2024-06-06 ENCOUNTER — HOSPITAL ENCOUNTER (OUTPATIENT)
Dept: PAIN MEDICINE | Facility: CLINIC | Age: 79
Discharge: HOME | End: 2024-06-06
Payer: MEDICARE

## 2024-06-06 VITALS
HEART RATE: 86 BPM | RESPIRATION RATE: 15 BRPM | BODY MASS INDEX: 26.5 KG/M2 | SYSTOLIC BLOOD PRESSURE: 120 MMHG | OXYGEN SATURATION: 95 % | WEIGHT: 135 LBS | DIASTOLIC BLOOD PRESSURE: 68 MMHG | TEMPERATURE: 97.9 F | HEIGHT: 60 IN

## 2024-06-06 DIAGNOSIS — M54.16 LUMBAR NEURITIS: ICD-10-CM

## 2024-06-06 PROCEDURE — 64483 NJX AA&/STRD TFRM EPI L/S 1: CPT | Performed by: ANESTHESIOLOGY

## 2024-06-06 PROCEDURE — 7100000010 HC PHASE TWO TIME - EACH INCREMENTAL 1 MINUTE

## 2024-06-06 PROCEDURE — 7100000009 HC PHASE TWO TIME - INITIAL BASE CHARGE

## 2024-06-06 PROCEDURE — 2500000005 HC RX 250 GENERAL PHARMACY W/O HCPCS

## 2024-06-06 PROCEDURE — A4216 STERILE WATER/SALINE, 10 ML: HCPCS

## 2024-06-06 PROCEDURE — 2500000004 HC RX 250 GENERAL PHARMACY W/ HCPCS (ALT 636 FOR OP/ED)

## 2024-06-06 PROCEDURE — 64484 NJX AA&/STRD TFRM EPI L/S EA: CPT | Performed by: ANESTHESIOLOGY

## 2024-06-06 RX ORDER — TRIAMCINOLONE ACETONIDE 40 MG/ML
INJECTION, SUSPENSION INTRA-ARTICULAR; INTRAMUSCULAR
Status: COMPLETED
Start: 2024-06-06 | End: 2024-06-06

## 2024-06-06 RX ORDER — LIDOCAINE HYDROCHLORIDE 5 MG/ML
INJECTION, SOLUTION INFILTRATION; INTRAVENOUS
Status: COMPLETED
Start: 2024-06-06 | End: 2024-06-06

## 2024-06-06 RX ORDER — SODIUM CHLORIDE 9 MG/ML
INJECTION, SOLUTION INTRAMUSCULAR; INTRAVENOUS; SUBCUTANEOUS
Status: COMPLETED
Start: 2024-06-06 | End: 2024-06-06

## 2024-06-06 RX ADMIN — LIDOCAINE HYDROCHLORIDE 250 MG: 5 INJECTION, SOLUTION INFILTRATION at 15:20

## 2024-06-06 RX ADMIN — TRIAMCINOLONE ACETONIDE 40 MG: 40 INJECTION, SUSPENSION INTRA-ARTICULAR; INTRAMUSCULAR at 15:20

## 2024-06-06 RX ADMIN — SODIUM CHLORIDE 10 ML: 9 INJECTION, SOLUTION INTRAMUSCULAR; INTRAVENOUS; SUBCUTANEOUS at 15:20

## 2024-06-06 ASSESSMENT — ENCOUNTER SYMPTOMS
DEPRESSION: 1
OCCASIONAL FEELINGS OF UNSTEADINESS: 1
LOSS OF SENSATION IN FEET: 1

## 2024-06-06 ASSESSMENT — PAIN SCALES - GENERAL
PAINLEVEL_OUTOF10: 8
PAINLEVEL_OUTOF10: 0 - NO PAIN

## 2024-06-06 ASSESSMENT — PAIN - FUNCTIONAL ASSESSMENT
PAIN_FUNCTIONAL_ASSESSMENT: 0-10
PAIN_FUNCTIONAL_ASSESSMENT: 0-10

## 2024-06-06 ASSESSMENT — PAIN DESCRIPTION - DESCRIPTORS: DESCRIPTORS: BURNING

## 2024-06-06 NOTE — OP NOTE
6/6/2024    Pre procedure Diagnosis: Lumbar neuritis  Post procedure Diagnosis: Lumbar neuritis    Procedure:     1. LEFT L3 AND L4 TRANSFORAMINAL epidural steroid injection   2. Fluoroscopic guidance     Complications: None    Assistants: None     EBL: None     The patient was identified in the preoperative area. After risks and benefits were explained, informed consent was obtained. The patient was brought back to the operating room and placed in the prone position. Standard ASA monitors were applied and monitored throughout the procedure. The vital signs were stable throughout. The patient's lumbosacral spine was prepped and draped in usual sterile fashion. Using fluoroscopic guidance the skin overlying the trajectory to the LEFT L3 AND L4 transforaminal space was anesthetized with a total of 10.0 ml of 0.5% Lidocaine. Thereafter, two 3 in long 22G spinal needle was advanced through the anesthitized skin. Then, the needle was advanced into the LEFT L3 AND L4 epidural space under multiplanar fluoroscopic guidance. Next a total of 2 ml of OMNIPAQUE 240 radiocontrast dye showed appropriate epidural spread and no vascular uptake. After negative aspiration for heme, or CSF a total of 4 mL of 0.5% Lidocaine and 40 mg of Kenalog was injected in equally divided doses at both sites. The needles were removed and a sterile dressings were applied. The patient tolerated the procedure well and was transported to PACU in good condition.    PLAN: The pt will follow up in the office in one to two months to report their results with the procedure. Discharge instructions were reviewed in recovery and provided to the patient in writing. They were advised to call should they have any questions or concerns.

## 2024-07-10 ENCOUNTER — APPOINTMENT (OUTPATIENT)
Dept: PAIN MEDICINE | Facility: CLINIC | Age: 79
End: 2024-07-10
Payer: MEDICARE

## 2024-08-13 ENCOUNTER — APPOINTMENT (OUTPATIENT)
Dept: NEUROSURGERY | Facility: CLINIC | Age: 79
End: 2024-08-13
Payer: MEDICARE

## 2024-08-13 VITALS
HEIGHT: 60 IN | WEIGHT: 147.2 LBS | HEART RATE: 74 BPM | DIASTOLIC BLOOD PRESSURE: 76 MMHG | SYSTOLIC BLOOD PRESSURE: 134 MMHG | BODY MASS INDEX: 28.9 KG/M2 | TEMPERATURE: 97.1 F

## 2024-08-13 DIAGNOSIS — M48.00 SPINAL STENOSIS, UNSPECIFIED SPINAL REGION: ICD-10-CM

## 2024-08-13 DIAGNOSIS — M54.50 LOW BACK PAIN, UNSPECIFIED BACK PAIN LATERALITY, UNSPECIFIED CHRONICITY, UNSPECIFIED WHETHER SCIATICA PRESENT: ICD-10-CM

## 2024-08-13 PROBLEM — E55.9 VITAMIN D DEFICIENCY: Status: ACTIVE | Noted: 2024-08-13

## 2024-08-13 PROBLEM — F41.9 ANXIETY DISORDER: Status: ACTIVE | Noted: 2024-08-13

## 2024-08-13 PROCEDURE — 3078F DIAST BP <80 MM HG: CPT | Performed by: NEUROLOGICAL SURGERY

## 2024-08-13 PROCEDURE — 1159F MED LIST DOCD IN RCRD: CPT | Performed by: NEUROLOGICAL SURGERY

## 2024-08-13 PROCEDURE — 99203 OFFICE O/P NEW LOW 30 MIN: CPT | Performed by: NEUROLOGICAL SURGERY

## 2024-08-13 PROCEDURE — 1125F AMNT PAIN NOTED PAIN PRSNT: CPT | Performed by: NEUROLOGICAL SURGERY

## 2024-08-13 PROCEDURE — 3075F SYST BP GE 130 - 139MM HG: CPT | Performed by: NEUROLOGICAL SURGERY

## 2024-08-13 PROCEDURE — 1036F TOBACCO NON-USER: CPT | Performed by: NEUROLOGICAL SURGERY

## 2024-08-13 RX ORDER — IBANDRONATE SODIUM 150 MG/1
150 TABLET, FILM COATED ORAL
COMMUNITY
Start: 2024-07-22

## 2024-08-13 ASSESSMENT — PATIENT HEALTH QUESTIONNAIRE - PHQ9
1. LITTLE INTEREST OR PLEASURE IN DOING THINGS: SEVERAL DAYS
SUM OF ALL RESPONSES TO PHQ9 QUESTIONS 1 AND 2: 2
2. FEELING DOWN, DEPRESSED OR HOPELESS: SEVERAL DAYS

## 2024-08-13 ASSESSMENT — PAIN SCALES - GENERAL: PAINLEVEL: 1

## 2024-08-13 NOTE — PROGRESS NOTES
[facility]  Department of Neurological Surgery  New Patient Visit    History of Present Illness:  Tea Wood is a 79 y.o. year old female who presents to the spine clinic with low back pain and bilateral extremity neurogenic claudication.  Patient is presenting to clinic with her daughter with complaints of back pain that is now resolved.  She had a fall few years ago which resulted in significant low back pain and lower extremity radicular symptoms.  She has undergone low back injections and PT with significant improvement in her symptoms.  Today she does not have any complaints of back pain or leg pain.  She is able to walk slowly less than a block.  She denies any recent falls.  She denies any radicular leg pain.  She also has some neck pain but denies any radicular arm pain.  MRI lumbar spine obtained on 4/22/2024 are personally reviewed with multilevel degenerative changes with moderate to severe stenosis at L3-4 and L4-5 centrally and lateral recess there is also moderate neuroforaminal stenosis at L4-5 and L5 S1.    Prior Spine Surgeries: no    Physical Therapy: yes  Diabetic: yes   Osteoporosis: no (osteopenia)     Patient's BMI is Body mass index is 28.75 kg/m².    Review of Systems:  10 point ROS is obtained and negative except the ones mentioned in the HPI    Patient Active Problem List   Diagnosis    Cobalamin deficiency    Swelling of lower limb    Subjective tinnitus of both ears    Subjective tinnitus    Strep throat    Stage 3 chronic kidney disease (Multi)    Spinal stenosis in cervical region    Snoring    Sensorineural hearing loss of both ears    Right upper quadrant abdominal pain    Rib contusion    Pure hyperglyceridemia    Productive cough    Overweight with body mass index (BMI) 25.0-29.9    Otitis externa, chronic    Obesity, Class I, BMI 30-34.9    Numbness    Neuropathy, diabetic (Multi)    Memory loss    Abnormal level of blood mineral    Impacted cerumen of  both ears    Impacted cerumen    Hypothyroidism    Hypomagnesemia    Hyperuricemia without signs of inflammatory arthritis and tophaceous disease    Hypercholesterolemia    History of hearing problem    History of hearing loss    Hemorrhoids    Hematoma    Head trauma    H/O: hypertension    H/O thyroid nodule    Gout    Esophagitis, reflux    Deficiency of glucose-6-phosphate dehydrogenase    Fecal incontinence    Fall    Essential hypertension    Erosive osteoarthritis    Epiphora    Dysphagia    Dizziness    Diabetes type 2, controlled (Multi)    Depression    Colon polyps    Chest pain    Carotid bruit    Bilateral epiphora    Back pain, chronic    Arthropathy    Aortic heart murmur    Ankylosing polyarthritis (Multi)    LILIANA positive    Allergic contact dermatitis due to plants, except food    Type 2 diabetes mellitus (Multi)    Uncontrolled type II diabetes mellitus with hypoglycemia (Multi)    Anxiety disorder    Vitamin D deficiency     Past Medical History:   Diagnosis Date    Anesthesia of skin     Numbness    Dizziness and giddiness 01/05/2016    Dizziness    Dysphagia, unspecified     Difficulty swallowing    Other specified cough     Productive cough    Other specified soft tissue disorders     Leg swelling    Personal history of other diseases of the circulatory system     History of hypertension    Personal history of other diseases of the musculoskeletal system and connective tissue 01/06/2016    History of rheumatoid arthritis    Personal history of other diseases of the nervous system and sense organs     History of tinnitus    Personal history of other diseases of the nervous system and sense organs     History of hearing loss    Personal history of other diseases of the respiratory system     Personal history of asthma    Personal history of other endocrine, nutritional and metabolic disease     History of diabetes mellitus    Personal history of other specified conditions     History of shortness  of breath    Snoring     Snoring    Unspecified epiphora, bilateral     Watery eyes     Past Surgical History:   Procedure Laterality Date    HYSTERECTOMY  01/05/2016    Hysterectomy    KNEE ARTHROSCOPY W/ DEBRIDEMENT  01/05/2016    Arthroscopy Knee    OTHER SURGICAL HISTORY  01/15/2014    Surgery    ROTATOR CUFF REPAIR  01/05/2016    Rotator Cuff Repair    SHOULDER SURGERY  01/05/2016    Shoulder Surgery    TOTAL KNEE ARTHROPLASTY  01/05/2016    Knee Replacement     Social History     Tobacco Use    Smoking status: Never    Smokeless tobacco: Never   Substance Use Topics    Alcohol use: Yes     Comment: 1 glass of wine 3x per week     family history is not on file.    Current Outpatient Medications:     allopurinol (Zyloprim) 100 mg tablet, Take by mouth., Disp: , Rfl:     Boniva 150 mg tablet, 1 tablet (150 mg)., Disp: , Rfl:     CALCIUM ORAL, Take 400 mg by mouth., Disp: , Rfl:     cyanocobalamin (Vitamin B-12) 1,000 mcg tablet, Take 1 tablet (1,000 mcg) by mouth once daily., Disp: , Rfl:     ergocalciferol (Vitamin D-2) 1.25 MG (41556 UT) capsule, TAKE 1 CAPSULE (50,000 UNITS) BY MOUTH EVERY FIRST AND FIFTEENTH OF EVERY MONTH, Disp: , Rfl:     losartan (Cozaar) 25 mg tablet, Take by mouth., Disp: , Rfl:     metFORMIN  mg 24 hr tablet, Take 1 tablet (500 mg) by mouth once daily in the evening. Take with meals., Disp: , Rfl:     nebulizers misc, , Disp: , Rfl:     NON FORMULARY, ONETOUCH ULTRA MINI W/DEVICE KIT, Disp: , Rfl:     NON FORMULARY, ONETOUCH BASIC/PROFILE TEST STRIPS, Disp: , Rfl:     NON FORMULARY, ONETOUCH FINEPOINT LANCETS, Disp: , Rfl:     omeprazole (PriLOSEC) 40 mg DR capsule, , Disp: , Rfl:     OneTouch Ultra Test strip, OneTouch Ultra Blue STRP  Quantity: 100  Refills: 0      Start : 25-Feb-2013  Active, Disp: , Rfl:     rosuvastatin (Crestor) 40 mg tablet, 1 tabs, ORAL, DAILY, 90 tabs, 90, Date: 8/24/23 7:58:00 AM EDT, [facility], TAKE 1 TABLET BY MOUTH EVERY DAY, 157.48, 01/03/2023  07:05:00 EST, Height/Length Dosing, cm, 66.2, 01/03/2023 07:05:00 EST, Weight Dosing, kg, Disp: , Rfl:     sertraline (Zoloft) 25 mg tablet, Take 1 tablet (25 mg) by mouth once daily., Disp: , Rfl:     Synthroid 75 mcg tablet, Take by mouth., Disp: , Rfl:     aspirin 81 mg EC tablet, 1 tablet (81 mg)., Disp: , Rfl:     B complx-C-folic-zinc-copper-E 500 mg-400 mcg- 23.9 mg-3 mg tablet, , Disp: , Rfl:     cholecalciferol (Vitamin D-3) 50 mcg (2,000 unit) capsule, Take 1 capsule (50 mcg) by mouth once daily., Disp: , Rfl:     cholecalciferol (Vitamin D3) 1,250 mcg (50,000 unit) tablet, Take by mouth., Disp: , Rfl:     colchicine (Mitigare) 0.6 mg capsule capsule, Take by mouth., Disp: , Rfl:     Ear Drops, carbamide peroxide, 6.5 % otic solution, INSTILL 5 DROPS IN BOTH EARS TWICE A DAY FOR 5 DAYS, Disp: , Rfl:     gabapentin (Neurontin) 100 mg capsule, Take 1 capsule (100 mg) by mouth once daily., Disp: , Rfl:     magnesium oxide/magnesium (MAGNESIUM, OXIDE/AA CHELATE, ORAL), , Disp: , Rfl:     metFORMIN  mg 24 hr tablet, 1 tablet (500 mg)., Disp: , Rfl:     sennosides (Senokot) 8.6 mg tablet, 2 tablets (17.2 mg)., Disp: , Rfl:     sertraline (Zoloft) 100 mg tablet, Take by mouth., Disp: , Rfl:   Allergies   Allergen Reactions    Codeine Unknown    Ragweed Unknown    Sulfa (Sulfonamide Antibiotics) Swelling       Physical Examination    A&Ox3  Fcx4 5/5  SILT    Results    I personally reviewed and interpreted the imaging results as above    Assessment and Plan:    Tea Wood is a 79 y.o. year old female who presents to the spine clinic with low back pain and bilateral extremity neurogenic claudication.  Patient is presenting to clinic with her daughter with complaints of back pain that is now resolved.  She had a fall few years ago which resulted in significant low back pain and lower extremity radicular symptoms.  She has undergone low back injections and PT with significant improvement in her symptoms.   Today she does not have any complaints of back pain or leg pain.  She is able to walk slowly less than a block.  She denies any recent falls.  She denies any radicular leg pain.  She also has some neck pain but denies any radicular arm pain.  MRI lumbar spine obtained on 4/22/2024 are personally reviewed with multilevel degenerative changes with moderate to severe stenosis at L3-4 and L4-5 centrally and lateral recess there is also moderate neuroforaminal stenosis at L4-5 and L5 S1.    Patient with previous history of back pain and bilateral lower extremity pain which is now resolved with PT and injections.  Today she is asymptomatic.  Based on the imaging findings she would benefit from L3-L5 decompression if she were to develop worsening pain or neurogenic claudication.  We told her that she can follow-up with us on as-needed basis if she develops any of the symptoms mentioned above.

## 2024-09-06 ENCOUNTER — APPOINTMENT (OUTPATIENT)
Dept: PAIN MEDICINE | Facility: CLINIC | Age: 79
End: 2024-09-06
Payer: MEDICARE

## 2025-07-03 LAB
NON-UH HIE A/G RATIO: 1
NON-UH HIE ALB: 3.8 G/DL (ref 3.4–5)
NON-UH HIE ALK PHOS: 48 UNIT/L (ref 45–117)
NON-UH HIE BILIRUBIN, TOTAL: 0.6 MG/DL (ref 0.3–1.2)
NON-UH HIE BUN/CREAT RATIO: 22
NON-UH HIE BUN: 22 MG/DL (ref 9–23)
NON-UH HIE CALCIUM: 9.4 MG/DL (ref 8.7–10.4)
NON-UH HIE CALCULATED OSMOLALITY: 286 MOSM/KG (ref 275–295)
NON-UH HIE CHLORIDE: 105 MMOL/L (ref 98–107)
NON-UH HIE CO2, VENOUS: 27 MMOL/L (ref 20–31)
NON-UH HIE CREATININE: 1 MG/DL (ref 0.5–0.8)
NON-UH HIE GFR AA: >60
NON-UH HIE GLOBULIN: 3.6 G/DL
NON-UH HIE GLOMERULAR FILTRATION RATE: 53 ML/MIN/1.73M?
NON-UH HIE GLUCOSE: 96 MG/DL (ref 74–106)
NON-UH HIE GOT: 21 UNIT/L (ref 15–37)
NON-UH HIE GPT: 13 UNIT/L (ref 10–49)
NON-UH HIE HGB A1C: 6.2 %
NON-UH HIE K: 4.1 MMOL/L (ref 3.5–5.1)
NON-UH HIE NA: 142 MMOL/L (ref 135–145)
NON-UH HIE TOTAL PROTEIN: 7.4 G/DL (ref 5.7–8.2)
NON-UH HIE TSH: 1.89 UIU/ML (ref 0.55–4.78)
NON-UH HIE VIT D 25: 45 NG/ML